# Patient Record
Sex: MALE | Race: WHITE | URBAN - METROPOLITAN AREA
[De-identification: names, ages, dates, MRNs, and addresses within clinical notes are randomized per-mention and may not be internally consistent; named-entity substitution may affect disease eponyms.]

---

## 2024-01-05 ENCOUNTER — OFFICE VISIT (OUTPATIENT)
Age: 49
End: 2024-01-05
Payer: COMMERCIAL

## 2024-01-05 VITALS — HEART RATE: 71 BPM | DIASTOLIC BLOOD PRESSURE: 90 MMHG | SYSTOLIC BLOOD PRESSURE: 130 MMHG | WEIGHT: 204.6 LBS

## 2024-01-05 DIAGNOSIS — G72.89 ADULT ONSET BETHLEM MYOPATHY: Primary | ICD-10-CM

## 2024-01-05 PROCEDURE — 99214 OFFICE O/P EST MOD 30 MIN: CPT | Performed by: PSYCHIATRY & NEUROLOGY

## 2024-01-05 RX ORDER — MELOXICAM 15 MG/1
15 TABLET ORAL DAILY
COMMUNITY

## 2024-01-05 RX ORDER — TRAMADOL HYDROCHLORIDE 50 MG/1
50 TABLET ORAL DAILY PRN
Qty: 30 TABLET | Refills: 0 | Status: SHIPPED | OUTPATIENT
Start: 2024-01-05 | End: 2024-02-04

## 2024-01-05 RX ORDER — TRAMADOL HYDROCHLORIDE 50 MG/1
50 TABLET ORAL EVERY 6 HOURS PRN
COMMUNITY
End: 2024-01-05 | Stop reason: SDUPTHER

## 2024-01-05 NOTE — PROGRESS NOTES
fluent with intact naming, repetition, comprehension, reading, and writing  Cranial Nerve: II - XII normal.  Neck: 5/5  Muscle tone examination showed normal tone.  Muscle strength testing revealed ue- 5/5 except fingers 4/5 hips 4/5, hip adduction 3/5, hip abduction 5/5  Knees 5/5 except ext 4/5  Ankles 5/5, restricted dorsiflexion mahogany  Deep tendon reflexes were 2+ in ue, 1+ in legs.  The plantar reflex was not examined.  There was no evidence of rest, postural or kinetic tremor.  There was no dysmetria seen on the mahogany found on finger-nose-finger testing and heel to shin testing.  Rapid alternating movements were normal bilaterally.  There was Normal perception of primary and cortical sensory modalities. No extinction on double simultaneous stimulation..distal gradient with cold  Therewas no extinction on the Bilateral with bilateral simultaneous stimulus.  The gait examination showed difficulty getting up from chair without arms.  Waddling gait.  Can toe walk, heel walk.  Can tandem walk.  rhomberg's neg  Has left shoulder elevation, restricted rom in knees  Skin: Skin is warm. He is not diaphoretic.  Psychiatric: Memory, affect and judgment normal.        ASSESSMENT AND PLAN   1. Adult onset Bethlem myopathy  Has emg changes of a myopathy  With restricted rom in several large joints, his presentation is similar to that of his brother with proven bethlems myopathy  So no need to do genetic test  Advised his severe limitations with physical work.  Advised using mobic for pain.  Ok to use ultram prn , filled 30 pills    Advised against statins  Advised on using the afo braces for ankles for a short time   Advised trial of tens unit  Has handicap parking in place    - traMADol (ULTRAM) 50 MG tablet; Take 1 tablet by mouth daily as needed for Pain for up to 30 days. Max Daily Amount: 50 mg  Dispense: 30 tablet; Refill: 0          Cecily Rhodes MD

## 2024-07-12 ENCOUNTER — OFFICE VISIT (OUTPATIENT)
Age: 49
End: 2024-07-12
Payer: COMMERCIAL

## 2024-07-12 VITALS
HEIGHT: 71 IN | TEMPERATURE: 97.9 F | DIASTOLIC BLOOD PRESSURE: 89 MMHG | HEART RATE: 73 BPM | SYSTOLIC BLOOD PRESSURE: 135 MMHG | WEIGHT: 169.5 LBS | BODY MASS INDEX: 23.73 KG/M2

## 2024-07-12 DIAGNOSIS — G72.89 ADULT ONSET BETHLEM MYOPATHY: Primary | ICD-10-CM

## 2024-07-12 PROCEDURE — 99213 OFFICE O/P EST LOW 20 MIN: CPT | Performed by: PSYCHIATRY & NEUROLOGY

## 2024-07-12 RX ORDER — MELOXICAM 15 MG/1
15 TABLET ORAL DAILY
Qty: 30 TABLET | Refills: 5 | Status: SHIPPED | OUTPATIENT
Start: 2024-07-12

## 2024-07-12 RX ORDER — TRAMADOL HYDROCHLORIDE 50 MG/1
50 TABLET ORAL
Qty: 90 TABLET | Refills: 0 | Status: SHIPPED | OUTPATIENT
Start: 2024-07-12 | End: 2024-08-11

## 2024-07-12 RX ORDER — TRAMADOL HYDROCHLORIDE 50 MG/1
TABLET ORAL
COMMUNITY
Start: 2024-05-13 | End: 2024-07-12 | Stop reason: SDUPTHER

## 2025-01-02 ENCOUNTER — TELEPHONE (OUTPATIENT)
Age: 50
End: 2025-01-02

## 2025-01-02 DIAGNOSIS — G72.89 ADULT ONSET BETHLEM MYOPATHY: Primary | ICD-10-CM

## 2025-01-02 NOTE — TELEPHONE ENCOUNTER
Spoke to pt. States he was given tramadol in the past but I see it's discontinued on our side. Is it ok to fill?

## 2025-01-06 RX ORDER — TRAMADOL HYDROCHLORIDE 50 MG/1
50 TABLET ORAL EVERY 6 HOURS PRN
Qty: 28 TABLET | Refills: 0 | Status: SHIPPED
Start: 2025-01-06 | End: 2025-01-07

## 2025-01-06 NOTE — TELEPHONE ENCOUNTER
Get a pain agreement on file      Even though prescription is written q 6 hours prn, I want him to use it only sporadically, < 1 a day         The following medication has been approved and sent to your pharmacy    Requested Prescriptions     Signed Prescriptions Disp Refills    traMADol (ULTRAM) 50 MG tablet 28 tablet 0     Sig: Take 1 tablet by mouth every 6 hours as needed for Pain (max 1 a day) for up to 7 days. Intended supply: 3 days. Take lowest dose possible to manage pain Max Daily Amount: 200 mg     Authorizing Provider: RAJIV BUCHANAN

## 2025-01-07 RX ORDER — TRAMADOL HYDROCHLORIDE 50 MG/1
50 TABLET ORAL DAILY PRN
Qty: 30 TABLET | Refills: 0 | Status: SHIPPED | OUTPATIENT
Start: 2025-01-07 | End: 2025-02-06

## 2025-01-07 NOTE — TELEPHONE ENCOUNTER
The following medication has been approved and sent to your pharmacy    Requested Prescriptions     Signed Prescriptions Disp Refills    traMADol (ULTRAM) 50 MG tablet 30 tablet 0     Sig: Take 1 tablet by mouth daily as needed for Pain for up to 30 days. Max Daily Amount: 50 mg     Authorizing Provider: RAJIV BUCHANAN

## 2025-01-22 ENCOUNTER — TELEMEDICINE (OUTPATIENT)
Age: 50
End: 2025-01-22
Payer: MEDICARE

## 2025-01-22 ENCOUNTER — TELEPHONE (OUTPATIENT)
Age: 50
End: 2025-01-22

## 2025-01-22 DIAGNOSIS — G72.89 ADULT ONSET BETHLEM MYOPATHY: ICD-10-CM

## 2025-01-22 PROCEDURE — 99213 OFFICE O/P EST LOW 20 MIN: CPT | Performed by: PSYCHIATRY & NEUROLOGY

## 2025-01-22 RX ORDER — MELOXICAM 15 MG/1
15 TABLET ORAL DAILY
Qty: 90 TABLET | Refills: 1 | Status: SHIPPED | OUTPATIENT
Start: 2025-01-22

## 2025-01-22 RX ORDER — TRAMADOL HYDROCHLORIDE 50 MG/1
50 TABLET ORAL DAILY PRN
Qty: 30 TABLET | Refills: 0 | Status: SHIPPED | OUTPATIENT
Start: 2025-02-02 | End: 2025-03-04

## 2025-01-22 NOTE — PROGRESS NOTES
Cecily Rhodes MD       Venkata Landrum is a 49 y.o. male presenting as a follow patient for a   Chief Complaint   Patient presents with    Follow-up      Onset: started worsening since 2020   Progression: stable progression in weakness  Stable       Balance: poor.  Cannot climb stairs.needs to climb 14 stairs at home     Falls: one tripping on ice     Weakness: none in arms.   Weakness getting up from chair, climbing.- uses rail and one foot at a time  Chair arms are not enough   Restricting his ability to get in and out of car. Able to get out of  trucks.   Sedans are a trouble   Restriction in rom in elbows/knees/hips/ankles/mildly in hands.   Stiffness in am   Using afo braces in feet intermittent , once or twice a day       Pain: affects mahogany thighs   Periodic sharp pain in knees/ankles.   Affects back   Rare pain in upper arms   Stretches make it worse.   Uses mobic q daily  Rarely ultram usage.         Denies ptosis, double vision.  Denies neck weakness.   Denies swallowing trouble.  Stable breathing. Except with exertion   No rhabdomyolysis/dark colored urine       Family hx of bethlems myopathy in brother.  MD in father and sister    Emg: findings of generalized myopathy, non necrotizing.     echo:2020  CONCLUSIONS:   - Exam indication: Shortness of Breath   - The left ventricle is normal in size. There is no left ventricular hypertrophy.   Left ventricular systolic function is normal. EF = 63 ± 5% (2D biplane)   - The right ventricle is normal in size. Right ventricular systolic function is   normal.   - The patient has not had a prior [facility] echocardiographic exam for comparison.         Treatment: mobic 15 mg q daily- helps with right hip pain only. Not muscle ache.   On ultram 50 mg q daily mostly - with cold weather. Only as a last resort. Average uses 15-20 pills a month   cymbalta- drowsy/weak. So d/abad   Uses tens unit         Quit smoking   Quit 40 lbs        Past Medical History:

## 2025-07-24 ENCOUNTER — OFFICE VISIT (OUTPATIENT)
Age: 50
End: 2025-07-24
Payer: MEDICARE

## 2025-07-24 VITALS
HEIGHT: 71 IN | DIASTOLIC BLOOD PRESSURE: 75 MMHG | BODY MASS INDEX: 25.76 KG/M2 | WEIGHT: 184 LBS | HEART RATE: 64 BPM | SYSTOLIC BLOOD PRESSURE: 113 MMHG

## 2025-07-24 DIAGNOSIS — G72.89 ADULT ONSET BETHLEM MYOPATHY: Primary | ICD-10-CM

## 2025-07-24 DIAGNOSIS — G89.29 CHRONIC BILATERAL LOW BACK PAIN WITHOUT SCIATICA: ICD-10-CM

## 2025-07-24 DIAGNOSIS — M54.50 CHRONIC BILATERAL LOW BACK PAIN WITHOUT SCIATICA: ICD-10-CM

## 2025-07-24 PROCEDURE — 99213 OFFICE O/P EST LOW 20 MIN: CPT | Performed by: PSYCHIATRY & NEUROLOGY

## 2025-07-24 RX ORDER — IBUPROFEN 600 MG/1
600 TABLET, FILM COATED ORAL EVERY 6 HOURS PRN
COMMUNITY

## 2025-07-24 NOTE — PATIENT INSTRUCTIONS
Lidoderm patches 5% - apply over lower back  12 hours on and 12 hours off          Reach out to muscular dystrophy association- about donation      Cecily Rhodes MD

## 2025-07-24 NOTE — PROGRESS NOTES
more incidents with tripping with range of motion restricted in his ankle  Avoiding falls as much as possible and using a seat lift  No longer on tramadol or Mobic for pain control  Interested in body donation for muscular dystrophy association      2. Chronic bilateral low back pain without sciatica  Using 200 mg of ibuprofen 3 tablets twice a day  Advised him to restrict on the ibuprofen use  Advised him to try Lidoderm patch  Advised him to use TENS unit and stretching      Has mild elevation in LFT compared to 2024  He has quit alcohol use 2 years ago  Recommend discussing with PCP about checking for fatty liver        Cecily Rhodes MD